# Patient Record
Sex: MALE | URBAN - NONMETROPOLITAN AREA
[De-identification: names, ages, dates, MRNs, and addresses within clinical notes are randomized per-mention and may not be internally consistent; named-entity substitution may affect disease eponyms.]

---

## 2022-04-26 ENCOUNTER — OFFICE VISIT (OUTPATIENT)
Dept: OPTOMETRY | Age: 27
End: 2022-04-26
Payer: COMMERCIAL

## 2022-04-26 DIAGNOSIS — H52.13 MYOPIA OF BOTH EYES WITH ASTIGMATISM: ICD-10-CM

## 2022-04-26 DIAGNOSIS — H52.203 MYOPIA OF BOTH EYES WITH ASTIGMATISM: ICD-10-CM

## 2022-04-26 PROCEDURE — 92015 DETERMINE REFRACTIVE STATE: CPT | Performed by: OPTOMETRIST

## 2022-04-26 PROCEDURE — 99212 OFFICE O/P EST SF 10 MIN: CPT

## 2022-04-26 PROCEDURE — 92004 COMPRE OPH EXAM NEW PT 1/>: CPT | Performed by: OPTOMETRIST

## 2022-04-26 ASSESSMENT — REFRACTION_WEARINGRX
OS_CYLINDER: -0.25
OS_AXIS: 083
OD_AXIS: 026
OD_SPHERE: -2.75
OD_CYLINDER: -0.25
OS_SPHERE: -2.50
SPECS_TYPE: SVL

## 2022-04-26 ASSESSMENT — ENCOUNTER SYMPTOMS
ALLERGIC/IMMUNOLOGIC NEGATIVE: 0
GASTROINTESTINAL NEGATIVE: 0
EYES NEGATIVE: 0
RESPIRATORY NEGATIVE: 0

## 2022-04-26 ASSESSMENT — REFRACTION_CURRENTRX
OD_CYLINDER: DS
OS_SPHERE: -2.75
OS_CYLINDER: DS
OD_BRAND: BIOTRUE 1 DAY
OD_SPHERE: -2.75
OS_BRAND: BIOTRUE 1 DAY

## 2022-04-26 ASSESSMENT — KERATOMETRY
OD_K2POWER_DIOPTERS: 43.75
OD_AXISANGLE_DEGREES: 133
OD_AXISANGLE2_DEGREES: 043
OS_K1POWER_DIOPTERS: 43.75
OD_K1POWER_DIOPTERS: 43.25
METHOD_AUTO_MANUAL: AUTOMATED
OS_AXISANGLE2_DEGREES: 152
OS_AXISANGLE_DEGREES: 062
OS_K2POWER_DIOPTERS: 44.00

## 2022-04-26 ASSESSMENT — VISUAL ACUITY
OD_CC+: -2
METHOD: SNELLEN - LINEAR
OS_CC: 20/20
CORRECTION_TYPE: GLASSES
OS_CC+: -1

## 2022-04-26 ASSESSMENT — REFRACTION_MANIFEST
OD_SPHERE: -2.75
OS_CYLINDER: -0.25
OS_AXIS: 055
OS_SPHERE: -2.50
OD_CYLINDER: DS

## 2022-04-26 ASSESSMENT — SLIT LAMP EXAM - LIDS
COMMENTS: NORMAL
COMMENTS: NORMAL

## 2022-04-26 ASSESSMENT — TONOMETRY
OD_IOP_MMHG: 20
OS_IOP_MMHG: 18
IOP_METHOD: NON-CONTACT AIR PUFF

## 2022-04-26 NOTE — PROGRESS NOTES
Audrey Velez presents today for   Chief Complaint   Patient presents with    Blurred Vision    Vision Exam   .    HPI     Blurred Vision     Laterality: both eyes    Quality: blurred    Context: distance vision              Comments     Last Vision Exam: 2 years  Last Ophthalmology Exam: n/a  Last Filled Glasses Rx: 2 years  Insurance: March  Update: Glasses  Distance is getting more blurry  Wears them full time  Does not wear contact lenses currently for about 2 years   Interested in daily lenses   We will use insurance towards contact lenses   And print copy of glasses                No current outpatient medications on file. No current facility-administered medications for this visit. Family History   Problem Relation Age of Onset    Cataracts Neg Hx     Diabetes Neg Hx     Glaucoma Neg Hx      Social History     Socioeconomic History    Marital status: Single     Spouse name: None    Number of children: None    Years of education: None    Highest education level: None   Occupational History    None   Tobacco Use    Smoking status: Never Smoker    Smokeless tobacco: Never Used   Substance and Sexual Activity    Alcohol use: None    Drug use: None    Sexual activity: None   Other Topics Concern    None   Social History Narrative    None     Social Determinants of Health     Financial Resource Strain:     Difficulty of Paying Living Expenses: Not on file   Food Insecurity:     Worried About Running Out of Food in the Last Year: Not on file    Eamon of Food in the Last Year: Not on file   Transportation Needs:     Lack of Transportation (Medical): Not on file    Lack of Transportation (Non-Medical):  Not on file   Physical Activity:     Days of Exercise per Week: Not on file    Minutes of Exercise per Session: Not on file   Stress:     Feeling of Stress : Not on file   Social Connections:     Frequency of Communication with Friends and Family: Not on file    Frequency of Social Gatherings with Friends and Family: Not on file    Attends Orthodoxy Services: Not on file    Active Member of Clubs or Organizations: Not on file    Attends Club or Organization Meetings: Not on file    Marital Status: Not on file   Intimate Partner Violence:     Fear of Current or Ex-Partner: Not on file    Emotionally Abused: Not on file    Physically Abused: Not on file    Sexually Abused: Not on file   Housing Stability:     Unable to Pay for Housing in the Last Year: Not on file    Number of [place] in the Last Year: Not on file    Unstable Housing in the Last Year: Not on file     History reviewed. No pertinent past medical history.     ROS     Negative for: Constitutional, Gastrointestinal, Neurological, Skin, Genitourinary, Musculoskeletal, HENT, Endocrine, Cardiovascular, Eyes, Respiratory, Psychiatric, Allergic/Imm, Heme/Lymph          Main Ophthalmology Exam     External Exam       Right Left    External Normal Normal          Slit Lamp Exam       Right Left    Lids/Lashes Normal Normal    Conjunctiva/Sclera White and quiet White and quiet    Cornea Clear Clear    Anterior Chamber Deep and quiet Deep and quiet    Iris Round and reactive Round and reactive    Lens Clear Clear    Vitreous Normal Normal          Fundus Exam       Right Left    Disc Normal Normal    C/D Ratio 0.2 0.2    Macula Normal Normal    Vessels Normal Normal             <div id=\"MAIN_EXAM_REVIEWED\"></div>      Tonometry     Tonometry (Non-contact air puff, 1:04 PM)       Right Left    Pressure 20 18   IOP.5              16.7  CH:  9.2          9.3  WS: 7.5          7.7                 Not recorded       Not recorded         Visual Acuity (Snellen - Linear)       Right Left    Dist cc 20/20 -2 20/20 -1    Correction: Glasses          Pupils     Pupils       Pupils    Right PERRL    Left PERRL              Neuro/Psych     Neuro/Psych     Oriented x3: Yes    Mood/Affect: Normal              Keratometry Keratometry (Automated)       K1 Axis K2 Axis    Right 43.25 043 43.75 133    Left 43.75 152 44.00 062                  Ophthalmology Exam     Wearing Rx       Sphere Cylinder Axis    Right -2.75 -0.25 026    Left -2.50 -0.25 083    Age: 2yrs    Type: SVL              Manifest Refraction     Manifest Refraction       Sphere Cylinder Axis Dist VA    Right -2.75 ds  20/20    Left -2.50 -0.25 055 20/20          Manifest Refraction #2 (Auto)       Sphere Cylinder Axis Dist VA    Right -2.75 0.00 000     Left -2.25 -0.25 056                Final Rx       Sphere Cylinder Axis    Right -2.75 ds     Left -2.50 -0.25 055    Type: SVL    Expiration Date: 4/26/2024            No orders of the defined types were placed in this encounter. IMPRESSION:  1. Myopia of both eyes with astigmatism        PLAN:    1.  New glasses recommended       Patient Instructions   We will order 2 90 packs and use insurance      Return in about 2 years (around 4/26/2024) for complete eye exam.